# Patient Record
Sex: FEMALE | Race: OTHER | HISPANIC OR LATINO | URBAN - METROPOLITAN AREA
[De-identification: names, ages, dates, MRNs, and addresses within clinical notes are randomized per-mention and may not be internally consistent; named-entity substitution may affect disease eponyms.]

---
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
## 2021-01-09 ENCOUNTER — APPOINTMENT (OUTPATIENT)
Dept: OTOLARYNGOLOGY | Facility: CLINIC | Age: 21
End: 2021-01-09

## 2021-04-27 ENCOUNTER — EMERGENCY (EMERGENCY)
Facility: HOSPITAL | Age: 21
LOS: 1 days | Discharge: HOME | End: 2021-04-27
Attending: INTERNAL MEDICINE | Admitting: INTERNAL MEDICINE
Payer: MEDICAID

## 2021-04-27 VITALS
HEIGHT: 63 IN | HEART RATE: 62 BPM | SYSTOLIC BLOOD PRESSURE: 107 MMHG | WEIGHT: 110.89 LBS | TEMPERATURE: 98 F | RESPIRATION RATE: 16 BRPM | OXYGEN SATURATION: 100 % | DIASTOLIC BLOOD PRESSURE: 71 MMHG

## 2021-04-27 PROCEDURE — 99285 EMERGENCY DEPT VISIT HI MDM: CPT

## 2021-04-27 NOTE — ED ADULT NURSE NOTE - CHPI ED NUR SYMPTOMS NEG
no back pain/no chest pain/no chills/no congestion/no diaphoresis/no fever/no nausea/no shortness of breath/no vomiting

## 2021-04-27 NOTE — ED ADULT NURSE NOTE - SUICIDE SCREENING QUESTION 2
----- Message from Sunil Silva No

## 2021-04-28 VITALS
OXYGEN SATURATION: 100 % | RESPIRATION RATE: 16 BRPM | HEART RATE: 62 BPM | DIASTOLIC BLOOD PRESSURE: 66 MMHG | SYSTOLIC BLOOD PRESSURE: 107 MMHG | TEMPERATURE: 98 F

## 2021-04-28 LAB
ALBUMIN SERPL ELPH-MCNC: 3.1 G/DL — LOW (ref 3.3–5)
ALP SERPL-CCNC: 62 U/L — SIGNIFICANT CHANGE UP (ref 40–120)
ALT FLD-CCNC: 20 U/L — SIGNIFICANT CHANGE UP (ref 12–78)
ANION GAP SERPL CALC-SCNC: 5 MMOL/L — SIGNIFICANT CHANGE UP (ref 5–17)
AST SERPL-CCNC: 14 U/L — LOW (ref 15–37)
BILIRUB SERPL-MCNC: 0.3 MG/DL — SIGNIFICANT CHANGE UP (ref 0.2–1.2)
BUN SERPL-MCNC: 12 MG/DL — SIGNIFICANT CHANGE UP (ref 7–23)
CALCIUM SERPL-MCNC: 8.2 MG/DL — LOW (ref 8.5–10.1)
CHLORIDE SERPL-SCNC: 113 MMOL/L — HIGH (ref 96–108)
CO2 SERPL-SCNC: 23 MMOL/L — SIGNIFICANT CHANGE UP (ref 22–31)
CREAT SERPL-MCNC: 0.64 MG/DL — SIGNIFICANT CHANGE UP (ref 0.5–1.3)
D DIMER BLD IA.RAPID-MCNC: 225 NG/ML DDU — SIGNIFICANT CHANGE UP
GLUCOSE SERPL-MCNC: 109 MG/DL — HIGH (ref 70–99)
HCG SERPL-ACNC: <1 MIU/ML — SIGNIFICANT CHANGE UP
HCT VFR BLD CALC: 32.6 % — LOW (ref 34.5–45)
HGB BLD-MCNC: 11 G/DL — LOW (ref 11.5–15.5)
MCHC RBC-ENTMCNC: 31.4 PG — SIGNIFICANT CHANGE UP (ref 27–34)
MCHC RBC-ENTMCNC: 33.7 GM/DL — SIGNIFICANT CHANGE UP (ref 32–36)
MCV RBC AUTO: 93.1 FL — SIGNIFICANT CHANGE UP (ref 80–100)
NRBC # BLD: 0 /100 WBCS — SIGNIFICANT CHANGE UP (ref 0–0)
PLATELET # BLD AUTO: 173 K/UL — SIGNIFICANT CHANGE UP (ref 150–400)
POTASSIUM SERPL-MCNC: 3.7 MMOL/L — SIGNIFICANT CHANGE UP (ref 3.5–5.3)
POTASSIUM SERPL-SCNC: 3.7 MMOL/L — SIGNIFICANT CHANGE UP (ref 3.5–5.3)
PROT SERPL-MCNC: 6.6 G/DL — SIGNIFICANT CHANGE UP (ref 6–8.3)
RBC # BLD: 3.5 M/UL — LOW (ref 3.8–5.2)
RBC # FLD: 12.6 % — SIGNIFICANT CHANGE UP (ref 10.3–14.5)
SODIUM SERPL-SCNC: 141 MMOL/L — SIGNIFICANT CHANGE UP (ref 135–145)
T3 SERPL-MCNC: 129 NG/DL — SIGNIFICANT CHANGE UP (ref 80–200)
T4 AB SER-ACNC: 9.5 UG/DL — SIGNIFICANT CHANGE UP (ref 4.6–12)
TSH SERPL-MCNC: 4.88 UIU/ML — HIGH (ref 0.36–3.74)
WBC # BLD: 8.06 K/UL — SIGNIFICANT CHANGE UP (ref 3.8–10.5)
WBC # FLD AUTO: 8.06 K/UL — SIGNIFICANT CHANGE UP (ref 3.8–10.5)

## 2021-04-28 PROCEDURE — 99283 EMERGENCY DEPT VISIT LOW MDM: CPT | Mod: 25

## 2021-04-28 PROCEDURE — 93005 ELECTROCARDIOGRAM TRACING: CPT

## 2021-04-28 PROCEDURE — 84702 CHORIONIC GONADOTROPIN TEST: CPT

## 2021-04-28 PROCEDURE — 85379 FIBRIN DEGRADATION QUANT: CPT

## 2021-04-28 PROCEDURE — 84484 ASSAY OF TROPONIN QUANT: CPT

## 2021-04-28 PROCEDURE — 84443 ASSAY THYROID STIM HORMONE: CPT

## 2021-04-28 PROCEDURE — 84436 ASSAY OF TOTAL THYROXINE: CPT

## 2021-04-28 PROCEDURE — 80053 COMPREHEN METABOLIC PANEL: CPT

## 2021-04-28 PROCEDURE — 85027 COMPLETE CBC AUTOMATED: CPT

## 2021-04-28 PROCEDURE — 93010 ELECTROCARDIOGRAM REPORT: CPT

## 2021-04-28 PROCEDURE — 96360 HYDRATION IV INFUSION INIT: CPT

## 2021-04-28 PROCEDURE — 36415 COLL VENOUS BLD VENIPUNCTURE: CPT

## 2021-04-28 PROCEDURE — 84480 ASSAY TRIIODOTHYRONINE (T3): CPT

## 2021-04-28 RX ORDER — SODIUM CHLORIDE 9 MG/ML
1000 INJECTION INTRAMUSCULAR; INTRAVENOUS; SUBCUTANEOUS ONCE
Refills: 0 | Status: COMPLETED | OUTPATIENT
Start: 2021-04-28 | End: 2021-04-28

## 2021-04-28 RX ADMIN — SODIUM CHLORIDE 1000 MILLILITER(S): 9 INJECTION INTRAMUSCULAR; INTRAVENOUS; SUBCUTANEOUS at 00:38

## 2021-04-28 RX ADMIN — SODIUM CHLORIDE 1000 MILLILITER(S): 9 INJECTION INTRAMUSCULAR; INTRAVENOUS; SUBCUTANEOUS at 01:30

## 2021-04-28 NOTE — ED PROVIDER NOTE - OBJECTIVE STATEMENT
dizziness and weakness for a week.  possible unwitnessed syncopal episode yesterday  dizziness  No 19 y/o female h/o thyroid disease C/C  dizziness and weakness for a week.  possible unwitnessed syncopal episode yesterday  no HA, no CP, no palpitations ,  no SOB, no fever, no chills, no stroke symptoms. no fever, no covis , received Pfizer one month ago

## 2021-04-28 NOTE — ED PROVIDER NOTE - PATIENT PORTAL LINK FT
You can access the FollowMyHealth Patient Portal offered by [facility] by registering at the following website: [email or web address]. By joining [employer]’s FollowMyHealth portal, you will also be able to view your health information using other applications (apps) compatible with our system.

## 2021-04-28 NOTE — ED PROVIDER NOTE - CARE PROVIDER_API CALL
Preeti Peterson)  Internal Medicine  [address] [place] [address]  Phone: [phone]  Fax: [phone]  Follow Up Time: 1-3 Days

## 2021-08-03 NOTE — ED ADULT NURSE NOTE - OBJECTIVE STATEMENT
used
Pt comes from triage. Pt reports dizziness for the past week with a possible syncopal episode yesterday. Pt breathing easy, unlabored, no signs of distress.

## 2021-11-17 NOTE — ED BEHAVIORAL HEALTH ASSESSMENT NOTE - NS ED [facility] MED ROS NEUROLOGICAL
No complaints Consent (Near Eyelid Margin)/Introductory Paragraph: The rationale for Mohs was explained to the patient and consent was obtained. The risks, benefits and alternatives to therapy were discussed in detail. Specifically, the risks of ectropion or eyelid deformity, infection, scarring, bleeding, prolonged wound healing, incomplete removal, allergy to anesthesia, nerve injury and recurrence were addressed. Prior to the procedure, the treatment site was clearly identified and confirmed by the patient. All components of Universal Protocol/PAUSE Rule completed.

## 2022-09-14 NOTE — ED PROVIDER NOTE - MUSCULOSKELETAL, MLM
Spine appears normal, range of motion is not limited, no muscle or joint tenderness Advancement Flap (Double) Text: The defect edges were debeveled with a #15 scalpel blade.  Given the location of the defect and the proximity to free margins a double advancement flap was deemed most appropriate.  Using a sterile surgical marker, the appropriate advancement flaps were drawn incorporating the defect and placing the expected incisions within the relaxed skin tension lines where possible.    The area thus outlined was incised deep to adipose tissue with a #15 scalpel blade.  The skin margins were undermined to an appropriate distance in all directions utilizing iris scissors.

## 2023-06-24 ENCOUNTER — EMERGENCY (EMERGENCY)
Facility: HOSPITAL | Age: 23
LOS: 1 days | Discharge: HOME | End: 2023-06-24
Attending: EMERGENCY MEDICINE | Admitting: EMERGENCY MEDICINE
Payer: MEDICAID

## 2023-06-24 VITALS
DIASTOLIC BLOOD PRESSURE: 67 MMHG | RESPIRATION RATE: 19 BRPM | TEMPERATURE: 98 F | WEIGHT: 104.94 LBS | OXYGEN SATURATION: 99 % | HEIGHT: 63 IN | HEART RATE: 82 BPM | SYSTOLIC BLOOD PRESSURE: 108 MMHG

## 2023-06-24 VITALS
TEMPERATURE: 99 F | SYSTOLIC BLOOD PRESSURE: 104 MMHG | RESPIRATION RATE: 19 BRPM | DIASTOLIC BLOOD PRESSURE: 67 MMHG | OXYGEN SATURATION: 100 % | HEART RATE: 72 BPM

## 2023-06-24 LAB
ALBUMIN SERPL ELPH-MCNC: 3.7 G/DL — SIGNIFICANT CHANGE UP (ref 3.3–5)
ALP SERPL-CCNC: 57 U/L — SIGNIFICANT CHANGE UP (ref 40–120)
ALT FLD-CCNC: 55 U/L — SIGNIFICANT CHANGE UP (ref 12–78)
ANION GAP SERPL CALC-SCNC: 5 MMOL/L — SIGNIFICANT CHANGE UP (ref 5–17)
APTT BLD: 29.6 SEC — SIGNIFICANT CHANGE UP (ref 27.5–35.5)
AST SERPL-CCNC: 27 U/L — SIGNIFICANT CHANGE UP (ref 15–37)
BASOPHILS # BLD AUTO: 0.03 K/UL — SIGNIFICANT CHANGE UP (ref 0–0.2)
BASOPHILS NFR BLD AUTO: 0.4 % — SIGNIFICANT CHANGE UP (ref 0–2)
BILIRUB SERPL-MCNC: 0.7 MG/DL — SIGNIFICANT CHANGE UP (ref 0.2–1.2)
BUN SERPL-MCNC: 6 MG/DL — LOW (ref 7–23)
CALCIUM SERPL-MCNC: 8.8 MG/DL — SIGNIFICANT CHANGE UP (ref 8.5–10.1)
CHLORIDE SERPL-SCNC: 112 MMOL/L — HIGH (ref 96–108)
CO2 SERPL-SCNC: 24 MMOL/L — SIGNIFICANT CHANGE UP (ref 22–31)
CREAT SERPL-MCNC: 0.58 MG/DL — SIGNIFICANT CHANGE UP (ref 0.5–1.3)
EGFR: 131 ML/MIN/1.73M2 — SIGNIFICANT CHANGE UP
EOSINOPHIL # BLD AUTO: 0.07 K/UL — SIGNIFICANT CHANGE UP (ref 0–0.5)
EOSINOPHIL NFR BLD AUTO: 1 % — SIGNIFICANT CHANGE UP (ref 0–6)
GLUCOSE SERPL-MCNC: 86 MG/DL — SIGNIFICANT CHANGE UP (ref 70–99)
HCG SERPL-ACNC: <1 MIU/ML — SIGNIFICANT CHANGE UP
HCT VFR BLD CALC: 36.9 % — SIGNIFICANT CHANGE UP (ref 34.5–45)
HGB BLD-MCNC: 12.1 G/DL — SIGNIFICANT CHANGE UP (ref 11.5–15.5)
IMM GRANULOCYTES NFR BLD AUTO: 0.3 % — SIGNIFICANT CHANGE UP (ref 0–0.9)
INR BLD: 1.07 RATIO — SIGNIFICANT CHANGE UP (ref 0.88–1.16)
LYMPHOCYTES # BLD AUTO: 2.39 K/UL — SIGNIFICANT CHANGE UP (ref 1–3.3)
LYMPHOCYTES # BLD AUTO: 33.9 % — SIGNIFICANT CHANGE UP (ref 13–44)
MCHC RBC-ENTMCNC: 31.3 PG — SIGNIFICANT CHANGE UP (ref 27–34)
MCHC RBC-ENTMCNC: 32.8 GM/DL — SIGNIFICANT CHANGE UP (ref 32–36)
MCV RBC AUTO: 95.3 FL — SIGNIFICANT CHANGE UP (ref 80–100)
MONOCYTES # BLD AUTO: 0.6 K/UL — SIGNIFICANT CHANGE UP (ref 0–0.9)
MONOCYTES NFR BLD AUTO: 8.5 % — SIGNIFICANT CHANGE UP (ref 2–14)
NEUTROPHILS # BLD AUTO: 3.95 K/UL — SIGNIFICANT CHANGE UP (ref 1.8–7.4)
NEUTROPHILS NFR BLD AUTO: 55.9 % — SIGNIFICANT CHANGE UP (ref 43–77)
NRBC # BLD: 0 /100 WBCS — SIGNIFICANT CHANGE UP (ref 0–0)
PLATELET # BLD AUTO: 204 K/UL — SIGNIFICANT CHANGE UP (ref 150–400)
POTASSIUM SERPL-MCNC: 3.8 MMOL/L — SIGNIFICANT CHANGE UP (ref 3.5–5.3)
POTASSIUM SERPL-SCNC: 3.8 MMOL/L — SIGNIFICANT CHANGE UP (ref 3.5–5.3)
PROT SERPL-MCNC: 7.2 G/DL — SIGNIFICANT CHANGE UP (ref 6–8.3)
PROTHROM AB SERPL-ACNC: 12.5 SEC — SIGNIFICANT CHANGE UP (ref 10.5–13.4)
RBC # BLD: 3.87 M/UL — SIGNIFICANT CHANGE UP (ref 3.8–5.2)
RBC # FLD: 12 % — SIGNIFICANT CHANGE UP (ref 10.3–14.5)
SODIUM SERPL-SCNC: 141 MMOL/L — SIGNIFICANT CHANGE UP (ref 135–145)
TROPONIN I, HIGH SENSITIVITY RESULT: <3 NG/L — SIGNIFICANT CHANGE UP
WBC # BLD: 7.06 K/UL — SIGNIFICANT CHANGE UP (ref 3.8–10.5)
WBC # FLD AUTO: 7.06 K/UL — SIGNIFICANT CHANGE UP (ref 3.8–10.5)

## 2023-06-24 PROCEDURE — 99285 EMERGENCY DEPT VISIT HI MDM: CPT | Mod: 25

## 2023-06-24 PROCEDURE — 85025 COMPLETE CBC W/AUTO DIFF WBC: CPT

## 2023-06-24 PROCEDURE — 99284 EMERGENCY DEPT VISIT MOD MDM: CPT | Mod: 25

## 2023-06-24 PROCEDURE — 85610 PROTHROMBIN TIME: CPT

## 2023-06-24 PROCEDURE — 70450 CT HEAD/BRAIN W/O DYE: CPT | Mod: 26,MA

## 2023-06-24 PROCEDURE — 93010 ELECTROCARDIOGRAM REPORT: CPT

## 2023-06-24 PROCEDURE — 84702 CHORIONIC GONADOTROPIN TEST: CPT

## 2023-06-24 PROCEDURE — 99285 EMERGENCY DEPT VISIT HI MDM: CPT

## 2023-06-24 PROCEDURE — 36415 COLL VENOUS BLD VENIPUNCTURE: CPT

## 2023-06-24 PROCEDURE — 70450 CT HEAD/BRAIN W/O DYE: CPT | Mod: MA

## 2023-06-24 PROCEDURE — 82962 GLUCOSE BLOOD TEST: CPT

## 2023-06-24 PROCEDURE — 85730 THROMBOPLASTIN TIME PARTIAL: CPT

## 2023-06-24 PROCEDURE — 84484 ASSAY OF TROPONIN QUANT: CPT

## 2023-06-24 PROCEDURE — 80053 COMPREHEN METABOLIC PANEL: CPT

## 2023-06-24 PROCEDURE — 93005 ELECTROCARDIOGRAM TRACING: CPT

## 2023-06-24 RX ORDER — SODIUM CHLORIDE 9 MG/ML
1000 INJECTION INTRAMUSCULAR; INTRAVENOUS; SUBCUTANEOUS ONCE
Refills: 0 | Status: COMPLETED | OUTPATIENT
Start: 2023-06-24 | End: 2023-06-24

## 2023-06-24 RX ADMIN — SODIUM CHLORIDE 1000 MILLILITER(S): 9 INJECTION INTRAMUSCULAR; INTRAVENOUS; SUBCUTANEOUS at 15:04

## 2023-06-24 NOTE — ED PROVIDER NOTE - NSFOLLOWUPINSTRUCTIONS_ED_ALL_ED_FT
Follow-up with your PCP for reevaluation, ongoing care and treatment.  Follow-up with urologist as discussed.  Rest, drink plenty of fluids.  If having worsening of symptoms or other related symptoms, return to the ER immediately.    Dizziness  Dizziness is a common problem. It makes you feel unsteady or light-headed. You may feel like you are about to pass out (faint). Dizziness can lead to getting hurt if you stumble or fall. Dizziness can be caused by many things, including:  Medicines.  Not having enough water in your body (dehydration).  Illness.  Follow these instructions at home:  Eating and drinking    A comparison of three sample cups showing dark yellow, yellow, and pale yellow urine.  Drink enough fluid to keep your pee (urine) pale yellow. This helps to keep you from getting dehydrated. Try to drink more clear fluids, such as water.  Do not drink alcohol.  Limit how much caffeine you drink or eat, if your doctor tells you to do that.  Limit how much salt (sodium) you drink or eat, if your doctor tells you to do that.  Activity    A sign showing that a person should not drive.  Avoid making quick movements.  Stand up slowly from sitting in a chair, and steady yourself until you feel okay.  In the morning, first sit up on the side of the bed. When you feel okay, stand up slowly while you hold onto something. Do this until you know that your balance is okay.  If you need to  one place for a long time, move your legs often. Tighten and relax the muscles in your legs while you are standing.  Do not drive or use machinery if you feel dizzy.  Avoid bending down if you feel dizzy. Place items in your home so you can reach them easily without leaning over.  Lifestyle    Do not smoke or use any products that contain nicotine or tobacco. If you need help quitting, ask your doctor.  Try to lower your stress level. You can do this by using methods such as yoga or meditation. Talk with your doctor if you need help.  General instructions    Watch your dizziness for any changes.  Take over-the-counter and prescription medicines only as told by your doctor. Talk with your doctor if you think that you are dizzy because of a medicine that you are taking.  Tell a friend or a family member that you are feeling dizzy. If he or she notices any changes in your behavior, have this person call your doctor.  Keep all follow-up visits.  Contact a doctor if:  Your dizziness does not go away.  Your dizziness or light-headedness gets worse.  You feel like you may vomit (are nauseous).  You have trouble hearing.  You have new symptoms.  You are unsteady on your feet.  You feel like the room is spinning.  You have neck pain or a stiff neck.  You have a fever.  Get help right away if:  You vomit or have watery poop (diarrhea), and you cannot eat or drink anything.  You have trouble:  Talking.  Walking.  Swallowing.  Using your arms, hands, or legs.  You feel generally weak.  You are not thinking clearly, or you have trouble forming sentences. A friend or family member may notice this.  You have:  Chest pain.  Pain in your belly (abdomen).  Shortness of breath.  Sweating.  Your vision changes.  You are bleeding.  You have a very bad headache.  These symptoms may be an emergency. Get help right away. Call your local emergency services (911 in the [place]  Do not wait to see if the symptoms will go away.  Do not drive yourself to the hospital.  Summary  Dizziness makes you feel unsteady or light-headed. You may feel like you are about to pass out (faint).  Drink enough fluid to keep your pee (urine) pale yellow. Do not drink alcohol.  Avoid making quick movements if you feel dizzy.  Watch your dizziness for any changes.  This information is not intended to replace advice given to you by your health care provider. Make sure you discuss any questions you have with your health care provider. Follow-up with your PCP for reevaluation, ongoing care and treatment.  Follow-up with neurologist as discussed.  Rest, drink plenty of fluids.  If having worsening of symptoms or other related symptoms, return to the ER immediately.    Dizziness  Dizziness is a common problem. It makes you feel unsteady or light-headed. You may feel like you are about to pass out (faint). Dizziness can lead to getting hurt if you stumble or fall. Dizziness can be caused by many things, including:  Medicines.  Not having enough water in your body (dehydration).  Illness.  Follow these instructions at home:  Eating and drinking    A comparison of three sample cups showing dark yellow, yellow, and pale yellow urine.  Drink enough fluid to keep your pee (urine) pale yellow. This helps to keep you from getting dehydrated. Try to drink more clear fluids, such as water.  Do not drink alcohol.  Limit how much caffeine you drink or eat, if your doctor tells you to do that.  Limit how much salt (sodium) you drink or eat, if your doctor tells you to do that.  Activity    A sign showing that a person should not drive.  Avoid making quick movements.  Stand up slowly from sitting in a chair, and steady yourself until you feel okay.  In the morning, first sit up on the side of the bed. When you feel okay, stand up slowly while you hold onto something. Do this until you know that your balance is okay.  If you need to  one place for a long time, move your legs often. Tighten and relax the muscles in your legs while you are standing.  Do not drive or use machinery if you feel dizzy.  Avoid bending down if you feel dizzy. Place items in your home so you can reach them easily without leaning over.  Lifestyle    Do not smoke or use any products that contain nicotine or tobacco. If you need help quitting, ask your doctor.  Try to lower your stress level. You can do this by using methods such as yoga or meditation. Talk with your doctor if you need help.  General instructions    Watch your dizziness for any changes.  Take over-the-counter and prescription medicines only as told by your doctor. Talk with your doctor if you think that you are dizzy because of a medicine that you are taking.  Tell a friend or a family member that you are feeling dizzy. If he or she notices any changes in your behavior, have this person call your doctor.  Keep all follow-up visits.  Contact a doctor if:  Your dizziness does not go away.  Your dizziness or light-headedness gets worse.  You feel like you may vomit (are nauseous).  You have trouble hearing.  You have new symptoms.  You are unsteady on your feet.  You feel like the room is spinning.  You have neck pain or a stiff neck.  You have a fever.  Get help right away if:  You vomit or have watery poop (diarrhea), and you cannot eat or drink anything.  You have trouble:  Talking.  Walking.  Swallowing.  Using your arms, hands, or legs.  You feel generally weak.  You are not thinking clearly, or you have trouble forming sentences. A friend or family member may notice this.  You have:  Chest pain.  Pain in your belly (abdomen).  Shortness of breath.  Sweating.  Your vision changes.  You are bleeding.  You have a very bad headache.  These symptoms may be an emergency. Get help right away. Call your local emergency services (911 in the [place]  Do not wait to see if the symptoms will go away.  Do not drive yourself to the hospital.  Summary  Dizziness makes you feel unsteady or light-headed. You may feel like you are about to pass out (faint).  Drink enough fluid to keep your pee (urine) pale yellow. Do not drink alcohol.  Avoid making quick movements if you feel dizzy.  Watch your dizziness for any changes.  This information is not intended to replace advice given to you by your health care provider. Make sure you discuss any questions you have with your health care provider.

## 2023-06-24 NOTE — ED PROVIDER NOTE - CARE PLAN
Principal Discharge DX:	Dizziness   1 Principal Discharge DX:	Dizziness  Secondary Diagnosis:	Generalized weakness

## 2023-06-24 NOTE — ED PROVIDER NOTE - EYES, MLM
Clear bilaterally, pupils equal, round and reactive to light. EOMI, no nystagmus Clear bilaterally, pupils equal, round and reactive to light. EOMI, no nystagmus, no photophobia

## 2023-06-24 NOTE — ED ADULT TRIAGE NOTE - SPO2 (%)
Buddy Rutherford MD 99

## 2023-06-24 NOTE — ED ADULT NURSE NOTE - NSFALLUNIVINTERV_ED_ALL_ED
Bed/Stretcher in lowest position, wheels locked, appropriate side rails in place/Call bell, personal items and telephone in reach/Instruct patient to call for assistance before getting out of bed/chair/stretcher/Non-slip footwear applied when patient is off stretcher/[place] to call system/Physically safe environment - no spills, clutter or unnecessary equipment/Purposeful proactive rounding/Room/bathroom lighting operational, light cord in reach

## 2023-06-24 NOTE — ED PROVIDER NOTE - PATIENT PORTAL LINK FT
You can access the FollowMyHealth Patient Portal offered by [facility] by registering at the following website: [email or web address]. By joining [facility]’s FollowMyHealth portal, you will also be able to view your health information using other applications (apps) compatible with our system.

## 2023-06-24 NOTE — ED PROVIDER NOTE - ATTENDING APP SHARED VISIT CONTRIBUTION OF CARE
Exam reveals a thin female in no acute distress appearing somewhat pale with clear lungs normal heart sounds benign soft nontender abdomen and neurologic evaluation that shows her to be alert and oriented x3 without any focal neurologic deficits.  I agree with plan and management outlined by PA.

## 2023-06-24 NOTE — ED PROVIDER NOTE - CARE PROVIDER_API CALL
YOUR PMD,   Phone: (   )    -  Fax: (   )    -  Follow Up Time: 1-3 Days    Layla Ascencio  Neurology  [address] [place] [address]  Phone: [phone]  Fax: [phone]  Follow Up Time: 1-3 Days

## 2023-06-24 NOTE — ED PROVIDER NOTE - NS ED MD DISPO DISCHARGE
I saw this patient in clinic today and she has questions about the reason why 1/4 of the polyps seen was not retrieved. Also, about having to wait for 3 years before another test can be done. She has positive family history of colon cancer in her dad and sister. Thanks. Home

## 2023-06-24 NOTE — ED PROVIDER NOTE - PROGRESS NOTE DETAILS
Reevaluated patient at bedside. Discussed the results of all diagnostic testing in ED and copies of all reports given. advised to f/u with pcp on monday and f/u neurologist, strict return precautions given.  An opportunity to ask questions was given.  Discussed the importance of prompt, close medical follow-up.  Patient will return with any changes, concerns or persistent / worsening symptoms.  Understanding of all instructions verbalized.

## 2023-06-24 NOTE — ED ADULT NURSE NOTE - OBJECTIVE STATEMENT
23yr old female a&ox4 arrives to ED from home notes x1 week of dizziness worse with positional change, hx of hypothyroidism, pt speech clear notes "feeling foggy" pt denies chest pain or sob, speech noted to be clear, no facial droop noted, generalized weakness, equal strengths in upper and lower extremity. Akira ROWE

## 2023-06-24 NOTE — ED PROVIDER NOTE - NS ED ATTENDING STATEMENT MOD
This was a shared visit with the SUREK[facility]. I reviewed and verified the documentation and independently performed the documented:

## 2023-06-24 NOTE — ED PROVIDER NOTE - CLINICAL SUMMARY MEDICAL DECISION MAKING FREE TEXT BOX
Patient is a 23-year-old female with 1 week of slight weakness dizziness lightheadedness without any nausea vomiting diarrhea fever chills.  Patient states that menses are normal and not heavy.  This case will require evaluation as well as labs.

## 2023-07-21 PROBLEM — F32.9 MAJOR DEPRESSIVE DISORDER, SINGLE EPISODE, UNSPECIFIED: Chronic | Status: ACTIVE | Noted: 2017-08-31

## 2023-07-21 RX ORDER — LEVOTHYROXINE SODIUM 125 MCG
1 TABLET ORAL
Qty: 0 | Refills: 0 | DISCHARGE

## 2023-08-19 ENCOUNTER — NON-APPOINTMENT (OUTPATIENT)
Age: 23
End: 2023-08-19

## 2023-10-17 ENCOUNTER — NON-APPOINTMENT (OUTPATIENT)
Age: 23
End: 2023-10-17

## 2023-10-25 ENCOUNTER — EMERGENCY (EMERGENCY)
Facility: HOSPITAL | Age: 23
LOS: 1 days | Discharge: HOME | End: 2023-10-25
Attending: STUDENT IN AN ORGANIZED HEALTH CARE EDUCATION/TRAINING PROGRAM | Admitting: STUDENT IN AN ORGANIZED HEALTH CARE EDUCATION/TRAINING PROGRAM
Payer: MEDICAID

## 2023-10-25 VITALS
HEART RATE: 91 BPM | DIASTOLIC BLOOD PRESSURE: 71 MMHG | RESPIRATION RATE: 17 BRPM | TEMPERATURE: 98 F | SYSTOLIC BLOOD PRESSURE: 113 MMHG | OXYGEN SATURATION: 98 %

## 2023-10-25 VITALS
HEART RATE: 89 BPM | HEIGHT: 63 IN | SYSTOLIC BLOOD PRESSURE: 106 MMHG | WEIGHT: 102.96 LBS | RESPIRATION RATE: 18 BRPM | DIASTOLIC BLOOD PRESSURE: 73 MMHG | OXYGEN SATURATION: 98 % | TEMPERATURE: 99 F

## 2023-10-25 PROBLEM — E03.9 HYPOTHYROIDISM, UNSPECIFIED: Chronic | Status: ACTIVE | Noted: 2021-04-28

## 2023-10-25 LAB
ALBUMIN SERPL ELPH-MCNC: 3.9 G/DL — SIGNIFICANT CHANGE UP (ref 3.3–5)
ALBUMIN SERPL ELPH-MCNC: 3.9 G/DL — SIGNIFICANT CHANGE UP (ref 3.3–5)
ALP SERPL-CCNC: 72 U/L — SIGNIFICANT CHANGE UP (ref 40–120)
ALP SERPL-CCNC: 72 U/L — SIGNIFICANT CHANGE UP (ref 40–120)
ALT FLD-CCNC: 28 U/L — SIGNIFICANT CHANGE UP (ref 12–78)
ALT FLD-CCNC: 28 U/L — SIGNIFICANT CHANGE UP (ref 12–78)
ANION GAP SERPL CALC-SCNC: 6 MMOL/L — SIGNIFICANT CHANGE UP (ref 5–17)
ANION GAP SERPL CALC-SCNC: 6 MMOL/L — SIGNIFICANT CHANGE UP (ref 5–17)
AST SERPL-CCNC: 17 U/L — SIGNIFICANT CHANGE UP (ref 15–37)
AST SERPL-CCNC: 17 U/L — SIGNIFICANT CHANGE UP (ref 15–37)
BASOPHILS # BLD AUTO: 0.03 K/UL — SIGNIFICANT CHANGE UP (ref 0–0.2)
BASOPHILS # BLD AUTO: 0.03 K/UL — SIGNIFICANT CHANGE UP (ref 0–0.2)
BASOPHILS NFR BLD AUTO: 0.4 % — SIGNIFICANT CHANGE UP (ref 0–2)
BASOPHILS NFR BLD AUTO: 0.4 % — SIGNIFICANT CHANGE UP (ref 0–2)
BILIRUB SERPL-MCNC: 0.7 MG/DL — SIGNIFICANT CHANGE UP (ref 0.2–1.2)
BILIRUB SERPL-MCNC: 0.7 MG/DL — SIGNIFICANT CHANGE UP (ref 0.2–1.2)
BUN SERPL-MCNC: 11 MG/DL — SIGNIFICANT CHANGE UP (ref 7–23)
BUN SERPL-MCNC: 11 MG/DL — SIGNIFICANT CHANGE UP (ref 7–23)
CALCIUM SERPL-MCNC: 9.3 MG/DL — SIGNIFICANT CHANGE UP (ref 8.5–10.1)
CALCIUM SERPL-MCNC: 9.3 MG/DL — SIGNIFICANT CHANGE UP (ref 8.5–10.1)
CHLORIDE SERPL-SCNC: 109 MMOL/L — HIGH (ref 96–108)
CHLORIDE SERPL-SCNC: 109 MMOL/L — HIGH (ref 96–108)
CO2 SERPL-SCNC: 25 MMOL/L — SIGNIFICANT CHANGE UP (ref 22–31)
CO2 SERPL-SCNC: 25 MMOL/L — SIGNIFICANT CHANGE UP (ref 22–31)
CREAT SERPL-MCNC: 0.75 MG/DL — SIGNIFICANT CHANGE UP (ref 0.5–1.3)
CREAT SERPL-MCNC: 0.75 MG/DL — SIGNIFICANT CHANGE UP (ref 0.5–1.3)
EGFR: 115 ML/MIN/1.73M2 — SIGNIFICANT CHANGE UP
EGFR: 115 ML/MIN/1.73M2 — SIGNIFICANT CHANGE UP
EOSINOPHIL # BLD AUTO: 0.11 K/UL — SIGNIFICANT CHANGE UP (ref 0–0.5)
EOSINOPHIL # BLD AUTO: 0.11 K/UL — SIGNIFICANT CHANGE UP (ref 0–0.5)
EOSINOPHIL NFR BLD AUTO: 1.6 % — SIGNIFICANT CHANGE UP (ref 0–6)
EOSINOPHIL NFR BLD AUTO: 1.6 % — SIGNIFICANT CHANGE UP (ref 0–6)
GLUCOSE SERPL-MCNC: 89 MG/DL — SIGNIFICANT CHANGE UP (ref 70–99)
GLUCOSE SERPL-MCNC: 89 MG/DL — SIGNIFICANT CHANGE UP (ref 70–99)
HCG SERPL-ACNC: <1 MIU/ML — SIGNIFICANT CHANGE UP
HCG SERPL-ACNC: <1 MIU/ML — SIGNIFICANT CHANGE UP
HCT VFR BLD CALC: 36 % — SIGNIFICANT CHANGE UP (ref 34.5–45)
HCT VFR BLD CALC: 36 % — SIGNIFICANT CHANGE UP (ref 34.5–45)
HGB BLD-MCNC: 12 G/DL — SIGNIFICANT CHANGE UP (ref 11.5–15.5)
HGB BLD-MCNC: 12 G/DL — SIGNIFICANT CHANGE UP (ref 11.5–15.5)
IMM GRANULOCYTES NFR BLD AUTO: 0.3 % — SIGNIFICANT CHANGE UP (ref 0–0.9)
IMM GRANULOCYTES NFR BLD AUTO: 0.3 % — SIGNIFICANT CHANGE UP (ref 0–0.9)
LYMPHOCYTES # BLD AUTO: 2.66 K/UL — SIGNIFICANT CHANGE UP (ref 1–3.3)
LYMPHOCYTES # BLD AUTO: 2.66 K/UL — SIGNIFICANT CHANGE UP (ref 1–3.3)
LYMPHOCYTES # BLD AUTO: 38.3 % — SIGNIFICANT CHANGE UP (ref 13–44)
LYMPHOCYTES # BLD AUTO: 38.3 % — SIGNIFICANT CHANGE UP (ref 13–44)
MCHC RBC-ENTMCNC: 31.8 PG — SIGNIFICANT CHANGE UP (ref 27–34)
MCHC RBC-ENTMCNC: 31.8 PG — SIGNIFICANT CHANGE UP (ref 27–34)
MCHC RBC-ENTMCNC: 33.3 GM/DL — SIGNIFICANT CHANGE UP (ref 32–36)
MCHC RBC-ENTMCNC: 33.3 GM/DL — SIGNIFICANT CHANGE UP (ref 32–36)
MCV RBC AUTO: 95.5 FL — SIGNIFICANT CHANGE UP (ref 80–100)
MCV RBC AUTO: 95.5 FL — SIGNIFICANT CHANGE UP (ref 80–100)
MONOCYTES # BLD AUTO: 0.55 K/UL — SIGNIFICANT CHANGE UP (ref 0–0.9)
MONOCYTES # BLD AUTO: 0.55 K/UL — SIGNIFICANT CHANGE UP (ref 0–0.9)
MONOCYTES NFR BLD AUTO: 7.9 % — SIGNIFICANT CHANGE UP (ref 2–14)
MONOCYTES NFR BLD AUTO: 7.9 % — SIGNIFICANT CHANGE UP (ref 2–14)
NEUTROPHILS # BLD AUTO: 3.57 K/UL — SIGNIFICANT CHANGE UP (ref 1.8–7.4)
NEUTROPHILS # BLD AUTO: 3.57 K/UL — SIGNIFICANT CHANGE UP (ref 1.8–7.4)
NEUTROPHILS NFR BLD AUTO: 51.5 % — SIGNIFICANT CHANGE UP (ref 43–77)
NEUTROPHILS NFR BLD AUTO: 51.5 % — SIGNIFICANT CHANGE UP (ref 43–77)
NRBC # BLD: 0 /100 WBCS — SIGNIFICANT CHANGE UP (ref 0–0)
NRBC # BLD: 0 /100 WBCS — SIGNIFICANT CHANGE UP (ref 0–0)
PLATELET # BLD AUTO: 198 K/UL — SIGNIFICANT CHANGE UP (ref 150–400)
PLATELET # BLD AUTO: 198 K/UL — SIGNIFICANT CHANGE UP (ref 150–400)
POTASSIUM SERPL-MCNC: 3.7 MMOL/L — SIGNIFICANT CHANGE UP (ref 3.5–5.3)
POTASSIUM SERPL-MCNC: 3.7 MMOL/L — SIGNIFICANT CHANGE UP (ref 3.5–5.3)
POTASSIUM SERPL-SCNC: 3.7 MMOL/L — SIGNIFICANT CHANGE UP (ref 3.5–5.3)
POTASSIUM SERPL-SCNC: 3.7 MMOL/L — SIGNIFICANT CHANGE UP (ref 3.5–5.3)
PROT SERPL-MCNC: 7.6 G/DL — SIGNIFICANT CHANGE UP (ref 6–8.3)
PROT SERPL-MCNC: 7.6 G/DL — SIGNIFICANT CHANGE UP (ref 6–8.3)
RBC # BLD: 3.77 M/UL — LOW (ref 3.8–5.2)
RBC # BLD: 3.77 M/UL — LOW (ref 3.8–5.2)
RBC # FLD: 12.4 % — SIGNIFICANT CHANGE UP (ref 10.3–14.5)
RBC # FLD: 12.4 % — SIGNIFICANT CHANGE UP (ref 10.3–14.5)
SODIUM SERPL-SCNC: 140 MMOL/L — SIGNIFICANT CHANGE UP (ref 135–145)
SODIUM SERPL-SCNC: 140 MMOL/L — SIGNIFICANT CHANGE UP (ref 135–145)
WBC # BLD: 6.94 K/UL — SIGNIFICANT CHANGE UP (ref 3.8–10.5)
WBC # BLD: 6.94 K/UL — SIGNIFICANT CHANGE UP (ref 3.8–10.5)
WBC # FLD AUTO: 6.94 K/UL — SIGNIFICANT CHANGE UP (ref 3.8–10.5)
WBC # FLD AUTO: 6.94 K/UL — SIGNIFICANT CHANGE UP (ref 3.8–10.5)

## 2023-10-25 PROCEDURE — 99284 EMERGENCY DEPT VISIT MOD MDM: CPT | Mod: 25

## 2023-10-25 PROCEDURE — 99284 EMERGENCY DEPT VISIT MOD MDM: CPT

## 2023-10-25 PROCEDURE — 85025 COMPLETE CBC W/AUTO DIFF WBC: CPT

## 2023-10-25 PROCEDURE — 84702 CHORIONIC GONADOTROPIN TEST: CPT

## 2023-10-25 PROCEDURE — 96374 THER/PROPH/DIAG INJ IV PUSH: CPT

## 2023-10-25 PROCEDURE — 36415 COLL VENOUS BLD VENIPUNCTURE: CPT

## 2023-10-25 PROCEDURE — 80053 COMPREHEN METABOLIC PANEL: CPT

## 2023-10-25 RX ORDER — KETOROLAC TROMETHAMINE 30 MG/ML
30 SYRINGE (ML) INJECTION ONCE
Refills: 0 | Status: COMPLETED | OUTPATIENT
Start: 2023-10-25 | End: 2023-10-25

## 2023-10-25 RX ORDER — METOCLOPRAMIDE HCL 10 MG
10 TABLET ORAL ONCE
Refills: 0 | Status: COMPLETED | OUTPATIENT
Start: 2023-10-25 | End: 2023-10-25

## 2023-10-25 RX ORDER — SODIUM CHLORIDE 9 MG/ML
1000 INJECTION INTRAMUSCULAR; INTRAVENOUS; SUBCUTANEOUS ONCE
Refills: 0 | Status: COMPLETED | OUTPATIENT
Start: 2023-10-25 | End: 2023-10-25

## 2023-10-25 RX ADMIN — Medication 10 MILLIGRAM(S): at 14:58

## 2023-10-25 RX ADMIN — SODIUM CHLORIDE 1000 MILLILITER(S): 9 INJECTION INTRAMUSCULAR; INTRAVENOUS; SUBCUTANEOUS at 14:58

## 2023-10-25 NOTE — ED PROVIDER NOTE - CLINICAL SUMMARY MEDICAL DECISION MAKING FREE TEXT BOX
24 y/o F hx of Migraines on Ubrevly presenting with L sided throbbing headache x 1 week  Initially started with accompanying migraine symptoms that have abated but headache persist  Non-focal neurological exam  Took Ubrevly and acetaminophen; headache remains 5/10  Had CT in June and MR Head in July; following with outpatient neurologist  Will treat symptomatically with anti-migraine cocktail and reassess. 24 y/o F hx of Migraines on Ubrevly presenting with R sided throbbing headache x 1 week  Initially started with accompanying migraine symptoms that have abated but headache persist  Non-focal neurological exam  Took Ubrevly and acetaminophen; headache remains 5/10  Had CT in June and MR Head in July; following with outpatient neurologist  Will treat symptomatically with anti-migraine cocktail and reassess.

## 2023-10-25 NOTE — ED ADULT NURSE REASSESSMENT NOTE - NS ED NURSE REASSESS COMMENT FT1
1520:  Patient states "I am very anxious about being here.  There are all these people and they are coughing and this is just making me feel worse.  Id really like to just go".  informed MD, awaiting paperwork.  Patient was given NS and reglan, but not toradol.  MD aware.  jorge l rudolph.

## 2023-10-25 NOTE — ED ADULT NURSE NOTE - OBJECTIVE STATEMENT
the patient presents to the er with a continuous headache which she states that she has had for one week.  the patient states that she has taken umbr

## 2023-10-25 NOTE — ED PROVIDER NOTE - PROGRESS NOTE DETAILS
pt requesting dc. All questions were answered. Discussed the importance of prompt, close medical follow-up. Patient will return with any changes, concerns or persistent/worsening symptoms.  Patient verbalized understanding.

## 2023-10-25 NOTE — ED PROVIDER NOTE - PATIENT PORTAL LINK FT
You can access the FollowMyHealth Patient Portal offered by [facility] by registering at the following website: [email or web address]. By joining [facility]’s FollowMyHealth portal, you will also be able to view your health information using other applications (apps) compatible with our system.

## 2023-10-25 NOTE — ED ADULT NURSE NOTE - NSFALLUNIVINTERV_ED_ALL_ED
Bed/Stretcher in lowest position, wheels locked, appropriate side rails in place/Call bell, personal items and telephone in reach/Instruct patient to call for assistance before getting out of bed/chair/stretcher/Non-slip footwear applied when patient is off stretcher/[place] to call system/Physically safe environment - no spills, clutter or unnecessary equipment/Purposeful proactive rounding/Room/bathroom lighting operational, light cord in reach

## 2023-10-25 NOTE — ED PROVIDER NOTE - NSFOLLOWUPINSTRUCTIONS_ED_ALL_ED_FT
General Headache Without Cause    A headache is pain or discomfort you feel around the head or neck area. There are many causes and types of headaches. In some cases, the cause may not be found.    Follow these instructions at home:  Watch your condition for any changes. Let your doctor know about them. Take these steps to help with your condition:    Managing pain    A bag of ice on a towel on the skin.   A heating pad for use on the painful area.   Take over-the-counter and prescription medicines only as told by your doctor. This includes medicines for pain that are taken by mouth or put on the skin.  Lie down in a dark, quiet room when you have a headache.  If told, put ice on your head and neck area:  Put ice in a plastic bag.  Place a towel between your skin and the bag.  Leave the ice on for 20 minutes, 2–3 times per day.  Take off the ice if your skin turns bright red. This is very important. If you cannot feel pain, heat, or cold, you have a greater risk of damage to the area.  If told, put heat on the affected area. Use the heat source that your doctor recommends, such as a moist heat pack or a heating pad.  Place a towel between your skin and the heat source.  Leave the heat on for 20–30 minutes.  Take off the heat if your skin turns bright red. This is very important. If you cannot feel pain, heat, or cold, you have a greater risk of getting burned.  Keep lights dim if bright lights bother you or make your headaches worse.  Eating and drinking    Eat meals on a regular schedule.  If you drink alcohol:  Limit how much you have to:  0–1 drink a day for women who are not pregnant.  0–2 drinks a day for men.  Know how much alcohol is in a drink. In the [place] one drink equals one 12 oz bottle of beer (355 mL), one 5 oz glass of wine (148 mL), or one 1½ oz glass of hard liquor (44 mL).  Stop drinking caffeine, or drink less caffeine.  General instructions    A person writing in a journal.   Keep a journal to find out if certain things bring on headaches. For example, write down:  What you eat and drink.  How much sleep you get.  Any change to your diet or medicines.  Get a massage or try other ways to relax.  Limit stress.  Sit up straight. Do not tighten (tense) your muscles.  Do not smoke or use any products that contain nicotine or tobacco. If you need help quitting, ask your doctor.  Exercise regularly as told by your doctor.  Get enough sleep. This often means 7–9 hours of sleep each night.  Keep all follow-up visits. This is important.  Contact a doctor if:  Medicine does not help your symptoms.  You have a headache that feels different than the other headaches.  You feel like you may vomit (nauseous) or you vomit.  You have a fever.  Get help right away if:  Your headache:  Gets very bad quickly.  Gets worse after a lot of physical activity.  You have any of these symptoms:  You continue to vomit.  A stiff neck.  Trouble seeing.  Your eye or ear hurts.  Trouble speaking.  Weak muscles or you lose muscle control.  You lose your balance or have trouble walking.  You feel like you will pass out (faint) or you pass out.  You are mixed up (confused).  You have a seizure.  These symptoms may be an emergency. Get help right away. Call your local emergency services (911 in the [place]  Do not wait to see if the symptoms will go away.  Do not drive yourself to the hospital.  Summary  A headache is pain or discomfort that is felt around the head or neck area.  There are many causes and types of headaches. In some cases, the cause may not be found.  Keep a journal to help find out what causes your headaches. Watch your condition for any changes. Let your doctor know about them.  Contact a doctor if you have a headache that is different from usual, or if medicine does not help your headache.  Get help right away if your headache gets very bad, you throw up, you have trouble seeing, you lose your balance, or you have a seizure.  This information is not intended to replace advice given to you by your health care provider. Make sure you discuss any questions you have with your health care provider. Follow up with your neurologist. return for worsening condition.       General Headache Without Cause    A headache is pain or discomfort you feel around the head or neck area. There are many causes and types of headaches. In some cases, the cause may not be found.    Follow these instructions at home:  Watch your condition for any changes. Let your doctor know about them. Take these steps to help with your condition:    Managing pain    A bag of ice on a towel on the skin.   A heating pad for use on the painful area.   Take over-the-counter and prescription medicines only as told by your doctor. This includes medicines for pain that are taken by mouth or put on the skin.  Lie down in a dark, quiet room when you have a headache.  If told, put ice on your head and neck area:  Put ice in a plastic bag.  Place a towel between your skin and the bag.  Leave the ice on for 20 minutes, 2–3 times per day.  Take off the ice if your skin turns bright red. This is very important. If you cannot feel pain, heat, or cold, you have a greater risk of damage to the area.  If told, put heat on the affected area. Use the heat source that your doctor recommends, such as a moist heat pack or a heating pad.  Place a towel between your skin and the heat source.  Leave the heat on for 20–30 minutes.  Take off the heat if your skin turns bright red. This is very important. If you cannot feel pain, heat, or cold, you have a greater risk of getting burned.  Keep lights dim if bright lights bother you or make your headaches worse.  Eating and drinking    Eat meals on a regular schedule.  If you drink alcohol:  Limit how much you have to:  0–1 drink a day for women who are not pregnant.  0–2 drinks a day for men.  Know how much alcohol is in a drink. In the [place] one drink equals one 12 oz bottle of beer (355 mL), one 5 oz glass of wine (148 mL), or one 1½ oz glass of hard liquor (44 mL).  Stop drinking caffeine, or drink less caffeine.  General instructions    A person writing in a journal.   Keep a journal to find out if certain things bring on headaches. For example, write down:  What you eat and drink.  How much sleep you get.  Any change to your diet or medicines.  Get a massage or try other ways to relax.  Limit stress.  Sit up straight. Do not tighten (tense) your muscles.  Do not smoke or use any products that contain nicotine or tobacco. If you need help quitting, ask your doctor.  Exercise regularly as told by your doctor.  Get enough sleep. This often means 7–9 hours of sleep each night.  Keep all follow-up visits. This is important.  Contact a doctor if:  Medicine does not help your symptoms.  You have a headache that feels different than the other headaches.  You feel like you may vomit (nauseous) or you vomit.  You have a fever.  Get help right away if:  Your headache:  Gets very bad quickly.  Gets worse after a lot of physical activity.  You have any of these symptoms:  You continue to vomit.  A stiff neck.  Trouble seeing.  Your eye or ear hurts.  Trouble speaking.  Weak muscles or you lose muscle control.  You lose your balance or have trouble walking.  You feel like you will pass out (faint) or you pass out.  You are mixed up (confused).  You have a seizure.  These symptoms may be an emergency. Get help right away. Call your local emergency services (911 in the [place]  Do not wait to see if the symptoms will go away.  Do not drive yourself to the hospital.  Summary  A headache is pain or discomfort that is felt around the head or neck area.  There are many causes and types of headaches. In some cases, the cause may not be found.  Keep a journal to help find out what causes your headaches. Watch your condition for any changes. Let your doctor know about them.  Contact a doctor if you have a headache that is different from usual, or if medicine does not help your headache.  Get help right away if your headache gets very bad, you throw up, you have trouble seeing, you lose your balance, or you have a seizure.  This information is not intended to replace advice given to you by your health care provider. Make sure you discuss any questions you have with your health care provider.

## 2023-10-25 NOTE — ED PROVIDER NOTE - OBJECTIVE STATEMENT
23-year-old female with past medical history of migraines presents today complaining of a headache x1 week.  Patient reports that she has followed with a neurologist and has had an MRI outpatient.  Patient is currently on Ubrelvy for migraines.  Patient reports that she has a headache currently 5 out of 10.  Patient admits to some tingling and body aches throughout.  Patient denies numbness, weakness, worse headache of life, visual changes today, slurred speech, facial droop, fever, or any other complaints.

## 2023-10-25 NOTE — ED ADULT TRIAGE NOTE - PAIN RATING/NUMBER SCALE (0-10): ACTIVITY
6 (moderate pain) PT CALLING THAT SWITCHED TO DIFF PRACTICE AND NEED TO KNOW WHEN SHE HAD TETANUS SHOT, SINCE THE NEW DR IS ASKING .  PT WAS ASKING FOR MEDICAL RECORDS PERSON. ADVISED LEFT FOR THE DAY.  PLEASE CALL PT CELL# [phone]

## 2023-10-25 NOTE — ED PROVIDER NOTE - PHYSICAL EXAMINATION
Constitutional: Awake, Alert, non-toxic. NAD  HEAD: Normocephalic, atraumatic.   EYES: PERRL, EOM intact, conjunctiva and sclera are clear bilaterally  ENT: No rhinorrhea, normal pharynx, patent, no tonsillar exudate or enlargement, mucous membranes pink/moist, no erythema, no drooling or stridor.   NECK: Supple, non-tender  CARDIOVASCULAR: Normal S1, S2; regular rate and rhythm.  RESPIRATORY: Normal respiratory effort; breath sounds CTAB, no wheezes, rhonchi, or rales. Speaking in full sentences. No accessory muscle use.   EXTREMITIES: Full passive and active ROM in all extremities; non-tender to palpation; distal pulses palpable and symmetric, no edema, no crepitus or step off  SKIN: Warm, dry; good skin turgor, no apparent lesions or rashes, no ecchymosis, brisk capillary refill.  NEURO: A&O x3. Sensory and motor functions are grossly intact. Speech is normal. Appearance and judgement seem appropriate for gender and age. No neurological deficits. Neurovascular sensation intact motor function 5/5 of upper and lower extremities, CN II-XII grossly intact, no ataxia, absent pronator drift, intact cerebellar function. Speech clear, without articulation or word-finding difficulties. Eyes- PERRL bilaterally. EOMs in tact. No nystagmus. No facial droop.

## 2023-10-25 NOTE — ED ADULT NURSE REASSESSMENT NOTE - NS ED NURSE REASSESS COMMENT FT1
1540:  patient d/c.  iv removed, site benign.  the patient was given all d/c papers and instructed to return to neuro for f/u.  the patient verbalized understanding of all instructions and states she feels better.  she ambulated out of the ed in stable condition, no distress and with all belongings.  vitals recorded.  jorge l rudolph.

## 2023-10-25 NOTE — ED PROVIDER NOTE - CARE PROVIDER_API CALL
Layla Ascencio  Neurology  [address] [place] [address]  Phone: [phone]  Fax: [phone]  Follow Up Time: 1-3 Days

## 2023-11-02 NOTE — ED PROVIDER NOTE - NSTIMEPROVIDERCAREINITIATE_GEN_ER
31-Aug-2017 00:58 Birth Control Pills Counseling: Birth Control Pill Counseling: I discussed with the patient the potential side effects of OCPs including but not limited to increased risk of stroke, heart attack, thrombophlebitis, deep venous thrombosis, hepatic adenomas, breast changes, GI upset, headaches, and depression.  The patient verbalized understanding of the proper use and possible adverse effects of OCPs. All of the patient's questions and concerns were addressed.

## 2023-11-14 ENCOUNTER — NON-APPOINTMENT (OUTPATIENT)
Age: 23
End: 2023-11-14

## 2024-01-07 ENCOUNTER — NON-APPOINTMENT (OUTPATIENT)
Age: 24
End: 2024-01-07

## 2024-02-27 ENCOUNTER — RX RENEWAL (OUTPATIENT)
Age: 24
End: 2024-02-27

## 2024-03-04 ENCOUNTER — APPOINTMENT (OUTPATIENT)
Dept: ORTHOPEDIC SURGERY | Facility: CLINIC | Age: 24
End: 2024-03-04

## 2024-03-07 ENCOUNTER — APPOINTMENT (OUTPATIENT)
Dept: ORTHOPEDIC SURGERY | Facility: CLINIC | Age: 24
End: 2024-03-07
Payer: MEDICAID

## 2024-03-07 VITALS — HEIGHT: 63 IN | BODY MASS INDEX: 18.61 KG/M2 | WEIGHT: 105 LBS

## 2024-03-07 DIAGNOSIS — S33.5XXA SPRAIN OF LIGAMENTS OF LUMBAR SPINE, INITIAL ENCOUNTER: ICD-10-CM

## 2024-03-07 DIAGNOSIS — G56.03 CARPAL TUNNEL SYNDROM,BILATERAL UPPER LIMBS: ICD-10-CM

## 2024-03-07 DIAGNOSIS — Z86.39 PERSONAL HISTORY OF OTHER ENDOCRINE, NUTRITIONAL AND METABOLIC DISEASE: ICD-10-CM

## 2024-03-07 PROCEDURE — 73130 X-RAY EXAM OF HAND: CPT | Mod: 50

## 2024-03-07 PROCEDURE — 99203 OFFICE O/P NEW LOW 30 MIN: CPT

## 2024-03-07 PROCEDURE — 72100 X-RAY EXAM L-S SPINE 2/3 VWS: CPT

## 2024-03-07 RX ORDER — NORETHINDRONE ACETATE AND ETHINYL ESTRADIOL AND FERROUS FUMARATE 1MG-20(21)
1-20 KIT ORAL
Qty: 84 | Refills: 0 | Status: ACTIVE | COMMUNITY
Start: 2023-12-22

## 2024-03-07 RX ORDER — LEVOTHYROXINE SODIUM 0.05 MG/1
50 TABLET ORAL
Qty: 30 | Refills: 0 | Status: ACTIVE | COMMUNITY
Start: 2023-10-04

## 2024-03-07 RX ORDER — FLUCONAZOLE 150 MG/1
150 TABLET ORAL
Qty: 3 | Refills: 0 | Status: ACTIVE | COMMUNITY
Start: 2023-12-27

## 2024-03-07 RX ORDER — CLINDAMYCIN HYDROCHLORIDE 300 MG/1
300 CAPSULE ORAL
Qty: 14 | Refills: 0 | Status: ACTIVE | COMMUNITY
Start: 2023-10-24

## 2024-03-07 RX ORDER — CLINDAMYCIN PHOSPHATE 1 G/10ML
1 GEL TOPICAL
Qty: 30 | Refills: 0 | Status: ACTIVE | COMMUNITY
Start: 2023-09-12

## 2024-03-07 NOTE — HISTORY OF PRESENT ILLNESS
[6] : 6 [Full time] : Work status: full time [de-identified] : Patient is a 23-year-old right-hand-dominant female presents with complaints of low back pain on the left side for over 2 months.  She denies any numbness or tingling down the legs or any bowel bladder symptoms.  She denies any injury.  She did do a chiropractor 1 time a week for 2 months which helped a little bit.  She still having discomfort.  She is also complaining of a 1 month history of pain and tightness in both hands and wrist.  She does state that she works as a  and spends a lot of time at her computer.  She also complains of some numbness and tingling.  She not had any treatment. 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

## 2024-03-07 NOTE — IMAGING
[de-identified] : She is alert and oriented vital signs are stable.  Examination shows she is able to stand on her toes and stand on her heels  Examination lumbosacral spine there are some tenderness palpation left paraspinal region with no evidence for spasm.  Stable forward flex to 60 degrees extension of the spine to 20 degrees lateral rotation of 30 degrees and lateral flexion of 20 degrees.  She had 5-5 strength bilateral lower extremities 2+ deep tendon reflexes throughout negative straight leg raising  Examination of the right and left hands and wrist revealed no tenderness.  She had no swelling.  She had full range of motion.  She had positive Tinel signs with positive Phalen signs are positive compression test bilaterally thenar strength to 5/5 bilaterally the flexors and extensors were intact bilaterally   Normal neurologic exam normal vascular exam normal skin exam.  There is no evidence of open wounds infection or compartment syndrome. [Bilateral] : hand bilaterally [FreeTextEntry1] : X-rays of bilateral hands 3 views each revealed no fractures or dislocations.

## 2024-03-07 NOTE — ASSESSMENT
[FreeTextEntry1] : Lumbosacral sprain/strain Bilateral carpal tunnel syndrome  Plan anti-inflammatories with GI precautions, the heating pad for the low back as well as a course of physical therapy.  If she continues to have symptoms she may ultimately require an MRI. She will be seen back in 4 to 6 weeks.  For the bilateral carpal tunnel syndrome I did recommend the anti-inflammatories GI precautions vitamin B6 as well as wrist support braces.  She continues have symptoms she mostly require EMG/NCV testing.  She should be seen back in 4 to 6 weeks.  Symptoms of Cauda equina were discussed with the patient. Patient was advised if they develop any numbness or tingling down the legs, weakness, or bowel or bladder symptoms they must call the office or go to the ER immediately.  This medical record was created utilizing Dragon voice recognition software.  This software is not perfect and may occasionally create typographical errors which may be reflected in the above medical record.

## 2024-04-11 ENCOUNTER — APPOINTMENT (OUTPATIENT)
Dept: ORTHOPEDIC SURGERY | Facility: CLINIC | Age: 24
End: 2024-04-11

## 2024-04-16 NOTE — ED BEHAVIORAL HEALTH ASSESSMENT NOTE - FAMILY DETAILS
[FreeTextEntry1] : 3y old with uri  budesonide bid for 2 weeks saline nebs every 4 hours can try hylands, zarbees cool mist humidifier, Vicks vapor rub  return in 1 week for f/u or sooner if cough worsens or develops fever 
mom, dad, 3 siblings (2 brothers, 1 sister)

## 2024-04-30 ENCOUNTER — NON-APPOINTMENT (OUTPATIENT)
Age: 24
End: 2024-04-30

## 2024-08-11 ENCOUNTER — NON-APPOINTMENT (OUTPATIENT)
Age: 24
End: 2024-08-11

## 2024-12-02 ENCOUNTER — NON-APPOINTMENT (OUTPATIENT)
Age: 24
End: 2024-12-02

## 2024-12-04 ENCOUNTER — NON-APPOINTMENT (OUTPATIENT)
Age: 24
End: 2024-12-04

## 2024-12-19 NOTE — ED PROVIDER NOTE - OBJECTIVE STATEMENT
Yes
23-year-old female with history of hypothyroidism, migraines presents with complaint of generalized weakness and dizziness x1 week.  Patient states that she has generalized weakness and dizziness, lightheadedness x1 week.  States that she has occasional nausea and head feels foggy.  Patient states that she has numbness/tingling sensation to both lower arms and lower legs.  Denies headache, fever, neck pain, rash, photophobia, nausea, vomiting, focal weakness, slurred speech, chest pain, abdominal pain, trauma/fall, cough, body aches, diarrhea, heavy menses, urinary symptoms or other symptoms.

## 2024-12-23 NOTE — ED PEDIATRIC TRIAGE NOTE - BANDS:
Depo Provera injection given. Patient aware to return to clinic in 13 weeks for next injection.. Patient tolerated without incident.  No change in history, no issues with previous injections. Next Annual 7/2/25  
Allergy;

## 2025-03-14 ENCOUNTER — NON-APPOINTMENT (OUTPATIENT)
Age: 25
End: 2025-03-14

## 2025-03-14 ENCOUNTER — EMERGENCY (EMERGENCY)
Facility: HOSPITAL | Age: 25
LOS: 1 days | Discharge: HOME | End: 2025-03-14
Attending: EMERGENCY MEDICINE | Admitting: EMERGENCY MEDICINE
Payer: COMMERCIAL

## 2025-03-14 VITALS
SYSTOLIC BLOOD PRESSURE: 104 MMHG | WEIGHT: 104.94 LBS | OXYGEN SATURATION: 100 % | DIASTOLIC BLOOD PRESSURE: 73 MMHG | RESPIRATION RATE: 18 BRPM | HEART RATE: 73 BPM | HEIGHT: 62 IN | TEMPERATURE: 98 F

## 2025-03-14 VITALS
DIASTOLIC BLOOD PRESSURE: 66 MMHG | OXYGEN SATURATION: 99 % | HEART RATE: 68 BPM | SYSTOLIC BLOOD PRESSURE: 110 MMHG | TEMPERATURE: 98 F | RESPIRATION RATE: 15 BRPM

## 2025-03-14 PROCEDURE — 70450 CT HEAD/BRAIN W/O DYE: CPT | Mod: 26

## 2025-03-14 PROCEDURE — 70450 CT HEAD/BRAIN W/O DYE: CPT | Mod: MC

## 2025-03-14 PROCEDURE — 99284 EMERGENCY DEPT VISIT MOD MDM: CPT | Mod: 25

## 2025-03-14 PROCEDURE — 99284 EMERGENCY DEPT VISIT MOD MDM: CPT

## 2025-03-14 RX ORDER — ACETAMINOPHEN 500 MG/5ML
975 LIQUID (ML) ORAL ONCE
Refills: 0 | Status: COMPLETED | OUTPATIENT
Start: 2025-03-14 | End: 2025-03-14

## 2025-03-14 RX ADMIN — Medication 975 MILLIGRAM(S): at 20:34

## 2025-03-14 NOTE — ED ADULT TRIAGE NOTE - NSSEPSISSUSPECTED_ED_A_ED
cues in order to increase functional integration into environment. SHORT TERM GOAL #3:  Goal 3: The patient will complete executive function tasks with 100% accuracy and min verbal cues during daily living activities to improve safety and awareness in functional living environment. SHORT TERM GOAL #4:  Goal 4: Locate items left/right of midline at page level for target cancellation tasks/trail-making/visual closure/address checking/editing/reading sentences (aloud)/paragraphs/signs/maps with minimal cues. SHORT TERM GOAL #5:  Goal 5: The patient will demonstrate functional problem solving and safety awareness with 100% accuracy and min verbal cues for daily living tasks in order to increase safe interaction with environment and decreased assitance from caregivers. Swallowing Short Term Goals  Short-term Goals  Goal 1: The patient will tolerate pureed diet with nectar thick liquids with no overt s/s of aspiration   Goal 2: Patient staff will follow swallow safety recommendations to decrease risk of penetration/aspiration during PO intake. Goal 3: The patient will complete oral motor exercises to improve labial/lingual strengthening/ROM with min verbal cues at 100% accuracy. Goal 4: The patient will complete laryngeal strengthening exercises (jorge luis, effortful swallow, mendolsohn) with mod verbal cues at 100% accuracy to improve airway protection.      Comprehension: 4 - Patient understands basic needs 75-90%+ of the time  Expression: 4 - Expresses basic ideas/needs 75-90%+ of the time  Social Interaction: 4 - Patient appropriate 75-90%+ of the time  Problem Solvin - Patient solves simple/routine tasks 75-90%+   Memory: 4 - Patient remembers 75-90%+ of the time    ASSESSMENT:  Assessment: [x]Progressing towards goals          []Not Progressing towards goals    Patient Tolerance of Treatment:   [x]Tolerated well []Tolerated fair []Required rest breaks []Fatigued    Education:  Learner:  [x]Patient []Significant Other          []Other  Education provided regarding:  [x]Goals and POC   []Diet and swallowing precautions    []Home Exercise Program  []Progress and/or discharge information  Method of Education:  [x]Discussion          []Demonstration          []Handout          []Other  Evaluation of Education:   [x]Verbalized understanding   []Demonstrates without assistance  []Demonstrates with assistance  []Needs further instruction     []No evidence of learning                  []No family present      Plan: [x]Continue with current plan of care    []Modify current plan of care as follows:    []Discharge patient    Discharge Location:    Services/Supervision Recommended:      [x]Patient continues to require treatment by a licensed therapist to address functional deficits as outlined in the established plan of care.     Electronically Signed By:  Enma Feldman  5/14/2020,3:39 PM. No

## 2025-03-14 NOTE — ED PROVIDER NOTE - OBJECTIVE STATEMENT
24-year-old female history of migraines, states that while getting into her car she hit the right side of her head on the door frame, complaining of right-sided headache, nausea and dizziness.

## 2025-03-14 NOTE — ED ADULT NURSE NOTE - OBJECTIVE STATEMENT
Pt  presents to Ed c/o pain to RT side of head. pt states she hit RT side of head on top of door/car frame upon entering drivers side of car- denies LOC... pt went home took a nap and upon awakening- had 30 min of "dizzy, body aches and chills"

## 2025-03-14 NOTE — ED PROVIDER NOTE - NSFOLLOWUPINSTRUCTIONS_ED_ALL_ED_FT
Drink plenty of fluids and stay well-hydrated.  May take over-the-counter ibuprofen 200 mg 2 tablets every 4-6 hours as needed for headache.  May alternate with extra strength Tylenol 500 mg 2 tablets every 4-6 hours as needed for headache.  Follow-up with your primary care doctor.  Return to the emergency department if having severe pain, prolonged vomiting and/or worsening of symptoms.

## 2025-03-14 NOTE — ED PROVIDER NOTE - CARE PROVIDER_API CALL
Torsten Murray  [address] [address]  Phone: [phone]  Fax: [phone]  Follow Up Time:

## 2025-03-14 NOTE — ED PROVIDER NOTE - PATIENT PORTAL LINK FT
You can access the FollowMyHealth Patient Portal offered by Health system by registering at the following website: [email or web address]. By joining [facility] FollowMyHealth portal, you will also be able to view your health information using other applications (apps) compatible with our system.

## 2025-03-14 NOTE — ED ADULT TRIAGE NOTE - CHIEF COMPLAINT QUOTE
24y F from home to ED... c/o pain to RT side of head... pt states she hit RT side of head on top of door/car frame upon entering drivers side of car- denies LOC... pt went home took a nap and upon awakening- had 30 min of "dizzy, body aches and chills"

## 2025-05-30 ENCOUNTER — NON-APPOINTMENT (OUTPATIENT)
Age: 25
End: 2025-05-30

## 2025-06-10 NOTE — ED PROVIDER NOTE - TEMPLATE, MLM
Bright Proctor MD   MELOXICAM PO Take 1 tablet by mouth daily Unsure of dose    Bright Proctor MD   gabapentin (NEURONTIN) 300 MG capsule Take 1 capsule by mouth 2 times daily as needed (neuropathy). Max Daily Amount: 600 mg    Bright Proctor MD   Prenatal Vit-Fe Fumarate-FA (PRENATAL VITAMIN PO) Take 1 tablet by mouth daily    Bright Proctor MD   cetirizine (ZYRTEC) 10 MG tablet Take 1 tablet by mouth daily    Automatic Reconciliation, Ar   clonazePAM (KLONOPIN) 0.5 MG tablet Take 1 tablet by mouth 4 times daily. 1/18/23   Automatic Reconciliation, Ar   mirtazapine (REMERON) 15 MG tablet Take 1 tablet by mouth nightly 10/2/20   Automatic Reconciliation, Ar        Allergies   Allergen Reactions    Penicillins Hives    Pantoprazole Swelling       Review of Systems:  A comprehensive review of systems was negative except for that written in the History of Present Illness.         Objective:     There were no vitals filed for this visit.     Physical Exam:  NAD, Lying in bed  Pelvic in OR        Assessment:     DUB      Plan:     [facility]    Signed By: Arash Hardy MD     Eileen 10, 2025        
Psych/Behavioral